# Patient Record
Sex: FEMALE | Race: WHITE | NOT HISPANIC OR LATINO | Employment: OTHER | URBAN - NONMETROPOLITAN AREA
[De-identification: names, ages, dates, MRNs, and addresses within clinical notes are randomized per-mention and may not be internally consistent; named-entity substitution may affect disease eponyms.]

---

## 2017-06-13 ENCOUNTER — HOSPITAL ENCOUNTER (EMERGENCY)
Facility: HOSPITAL | Age: 72
Discharge: HOME | End: 2017-06-13
Attending: EMERGENCY MEDICINE | Admitting: EMERGENCY MEDICINE

## 2017-06-13 ENCOUNTER — APPOINTMENT (OUTPATIENT)
Dept: GENERAL RADIOLOGY | Facility: HOSPITAL | Age: 72
End: 2017-06-13

## 2017-06-13 VITALS
DIASTOLIC BLOOD PRESSURE: 85 MMHG | WEIGHT: 105 LBS | SYSTOLIC BLOOD PRESSURE: 186 MMHG | BODY MASS INDEX: 18.61 KG/M2 | HEART RATE: 63 BPM | TEMPERATURE: 98 F | RESPIRATION RATE: 18 BRPM | HEIGHT: 63 IN | OXYGEN SATURATION: 99 %

## 2017-06-13 DIAGNOSIS — S20.461A: Primary | ICD-10-CM
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
## 2017-06-13 LAB
ALBUMIN SERPL-MCNC: 4.2 G/DL (ref 3.4–4.8)
ALBUMIN/GLOB SERPL: 1.4 G/DL (ref 1.5–2.5)
ALP SERPL-CCNC: 92 U/L (ref 35–104)
ALT SERPL W P-5'-P-CCNC: 13 U/L (ref 10–36)
ANION GAP SERPL CALCULATED.3IONS-SCNC: 11.4 MMOL/L (ref 3.6–11.2)
AST SERPL-CCNC: 18 U/L (ref 10–30)
BASOPHILS # BLD AUTO: 0.01 10*3/MM3 (ref 0–0.3)
BASOPHILS NFR BLD AUTO: 0.2 % (ref 0–2)
BILIRUB SERPL-MCNC: 0.5 MG/DL (ref 0.2–1.8)
BILIRUB UR QL STRIP: NEGATIVE
BUN BLD-MCNC: 12 MG/DL (ref 7–21)
BUN/CREAT SERPL: 14.6 (ref 7–25)
CALCIUM SPEC-SCNC: 9.8 MG/DL (ref 7.7–10)
CHLORIDE SERPL-SCNC: 109 MMOL/L (ref 99–112)
CLARITY UR: CLEAR
CO2 SERPL-SCNC: 22.6 MMOL/L (ref 24.3–31.9)
COLOR UR: YELLOW
CREAT BLD-MCNC: 0.82 MG/DL (ref 0.43–1.29)
DEPRECATED RDW RBC AUTO: 43.3 FL (ref 37–54)
EOSINOPHIL # BLD AUTO: 0.05 10*3/MM3 (ref 0–0.7)
EOSINOPHIL NFR BLD AUTO: 1.2 % (ref 0–7)
ERYTHROCYTE [DISTWIDTH] IN BLOOD BY AUTOMATED COUNT: 13.1 % (ref 11.5–14.5)
GFR SERPL CREATININE-BSD FRML MDRD: 69 ML/MIN/1.73
GLOBULIN UR ELPH-MCNC: 3.1 GM/DL
GLUCOSE BLD-MCNC: 111 MG/DL (ref 70–110)
GLUCOSE UR STRIP-MCNC: NEGATIVE MG/DL
HCT VFR BLD AUTO: 39.1 % (ref 37–47)
HGB BLD-MCNC: 12.9 G/DL (ref 12–16)
HGB UR QL STRIP.AUTO: NEGATIVE
IMM GRANULOCYTES # BLD: 0 10*3/MM3 (ref 0–0.03)
IMM GRANULOCYTES NFR BLD: 0 % (ref 0–0.5)
KETONES UR QL STRIP: NEGATIVE
LEUKOCYTE ESTERASE UR QL STRIP.AUTO: NEGATIVE
LYMPHOCYTES # BLD AUTO: 1.89 10*3/MM3 (ref 1–3)
LYMPHOCYTES NFR BLD AUTO: 46.3 % (ref 16–46)
MCH RBC QN AUTO: 30.6 PG (ref 27–33)
MCHC RBC AUTO-ENTMCNC: 33 G/DL (ref 33–37)
MCV RBC AUTO: 92.9 FL (ref 80–94)
MONOCYTES # BLD AUTO: 0.37 10*3/MM3 (ref 0.1–0.9)
MONOCYTES NFR BLD AUTO: 9.1 % (ref 0–12)
NEUTROPHILS # BLD AUTO: 1.76 10*3/MM3 (ref 1.4–6.5)
NEUTROPHILS NFR BLD AUTO: 43.2 % (ref 40–75)
NITRITE UR QL STRIP: NEGATIVE
OSMOLALITY SERPL CALC.SUM OF ELEC: 285.4 MOSM/KG (ref 273–305)
PH UR STRIP.AUTO: 6 [PH] (ref 5–8)
PLATELET # BLD AUTO: 234 10*3/MM3 (ref 130–400)
PMV BLD AUTO: 9.6 FL (ref 6–10)
POTASSIUM BLD-SCNC: 4.1 MMOL/L (ref 3.5–5.3)
PROT SERPL-MCNC: 7.3 G/DL (ref 6–8)
PROT UR QL STRIP: NEGATIVE
RBC # BLD AUTO: 4.21 10*6/MM3 (ref 4.2–5.4)
SODIUM BLD-SCNC: 143 MMOL/L (ref 135–153)
SP GR UR STRIP: 1.01 (ref 1–1.03)
UROBILINOGEN UR QL STRIP: NORMAL
WBC NRBC COR # BLD: 4.08 10*3/MM3 (ref 4.5–12.5)

## 2017-06-13 PROCEDURE — 81003 URINALYSIS AUTO W/O SCOPE: CPT | Performed by: EMERGENCY MEDICINE

## 2017-06-13 PROCEDURE — 85025 COMPLETE CBC W/AUTO DIFF WBC: CPT | Performed by: EMERGENCY MEDICINE

## 2017-06-13 PROCEDURE — 99283 EMERGENCY DEPT VISIT LOW MDM: CPT

## 2017-06-13 PROCEDURE — 74022 RADEX COMPL AQT ABD SERIES: CPT

## 2017-06-13 PROCEDURE — 74022 RADEX COMPL AQT ABD SERIES: CPT | Performed by: RADIOLOGY

## 2017-06-13 PROCEDURE — 80053 COMPREHEN METABOLIC PANEL: CPT | Performed by: EMERGENCY MEDICINE

## 2017-06-13 RX ORDER — ESCITALOPRAM OXALATE 20 MG/1
10 TABLET ORAL DAILY
COMMUNITY

## 2017-06-13 RX ORDER — AMLODIPINE BESYLATE 5 MG/1
5 TABLET ORAL DAILY
COMMUNITY

## 2017-06-13 NOTE — ED NOTES
Pt resting on side of bed, nad noted, states that she thinks that she has just pulled a muscle; nad noted at this time     Nayana Mariscal RN  06/13/17 1500

## 2017-06-13 NOTE — ED NOTES
No changes in pt status, nad noted, will continue to monitor.     Nayana Mariscal RN  06/13/17 [id number]

## 2017-06-13 NOTE — ED PROVIDER NOTES
Subjective   History of Present Illness  71-year-old white female complains of insect bite.  She says about one week ago she noticed a red spot on her right lumbar/flank area.  Her friend doctor this for her and it seems to be improving.  However now she complains of some abdominal pain in the right lower quadrant especially worse at night.  She says she's had a history of colitis due to stress, and has been under increased stress recently.  She's had mild dysuria, but denies any hematuria, fever, chills, nausea, vomiting or other complaints.  Review of Systems   All other systems reviewed and are negative.      Past Medical History:   Diagnosis Date   • Depression    • Hypertension        Allergies   Allergen Reactions   • Bactrim [Sulfamethoxazole-Trimethoprim]        History reviewed. No pertinent surgical history.    Family History   Problem Relation Age of Onset   • Heart failure Father        Social History     Social History   • Marital status:      Spouse name: N/A   • Number of children: N/A   • Years of education: N/A     Social History Main Topics   • Smoking status: Never Smoker   • Smokeless tobacco: None   • Alcohol use No   • Drug use: No   • Sexual activity: Defer     Other Topics Concern   • None     Social History Narrative   • None           Objective   Physical Exam   Constitutional: She is oriented to person, place, and time. She appears well-developed and well-nourished.   HENT:   Head: Normocephalic and atraumatic.   Cardiovascular: Normal rate, regular rhythm and normal heart sounds.  Exam reveals no gallop and no friction rub.    No murmur heard.  Pulmonary/Chest: Effort normal and breath sounds normal. No respiratory distress. She has no wheezes. She has no rales.   Abdominal: Soft. Bowel sounds are normal. She exhibits no distension. There is no tenderness.   Musculoskeletal: Normal range of motion. She exhibits no edema.   Neurological: She is alert and oriented to person, place,  and time.   Skin: Skin is warm and dry.        Healing superficial ulcer with eschar noted in the right lumbar area with no surrounding erythema, tenderness, discharge, induration, fluctuance.  It appears to be composed of 4 confluent round lesions.   Psychiatric: She has a normal mood and affect.   Nursing note and vitals reviewed.      Procedures  Results for orders placed or performed during the hospital encounter of 06/13/17   Comprehensive Metabolic Panel   Result Value Ref Range    Glucose 111 (H) 70 - 110 mg/dL    BUN 12 7 - 21 mg/dL    Creatinine 0.82 0.43 - 1.29 mg/dL    Sodium 143 135 - 153 mmol/L    Potassium 4.1 3.5 - 5.3 mmol/L    Chloride 109 99 - 112 mmol/L    CO2 22.6 (L) 24.3 - 31.9 mmol/L    Calcium 9.8 7.7 - 10.0 mg/dL    Total Protein 7.3 6.0 - 8.0 g/dL    Albumin 4.20 3.40 - 4.80 g/dL    ALT (SGPT) 13 10 - 36 U/L    AST (SGOT) 18 10 - 30 U/L    Alkaline Phosphatase 92 35 - 104 U/L    Total Bilirubin 0.5 0.2 - 1.8 mg/dL    eGFR Non African Amer 69 >60 mL/min/1.73    Globulin 3.1 gm/dL    A/G Ratio 1.4 (L) 1.5 - 2.5 g/dL    BUN/Creatinine Ratio 14.6 7.0 - 25.0    Anion Gap 11.4 (H) 3.6 - 11.2 mmol/L   Urinalysis With / Culture If Indicated   Result Value Ref Range    Color, UA Yellow Yellow, Straw    Appearance, UA Clear Clear    pH, UA 6.0 5.0 - 8.0    Specific Gravity, UA 1.009 1.005 - 1.030    Glucose, UA Negative Negative    Ketones, UA Negative Negative    Bilirubin, UA Negative Negative    Blood, UA Negative Negative    Protein, UA Negative Negative    Leuk Esterase, UA Negative Negative    Nitrite, UA Negative Negative    Urobilinogen, UA 0.2 E.U./dL 0.2 - 1.0 E.U./dL   CBC Auto Differential   Result Value Ref Range    WBC 4.08 (L) 4.50 - 12.50 10*3/mm3    RBC 4.21 4.20 - 5.40 10*6/mm3    Hemoglobin 12.9 12.0 - 16.0 g/dL    Hematocrit 39.1 37.0 - 47.0 %    MCV 92.9 80.0 - 94.0 fL    MCH 30.6 27.0 - 33.0 pg    MCHC 33.0 33.0 - 37.0 g/dL    RDW 13.1 11.5 - 14.5 %    RDW-SD 43.3 37.0 - 54.0 fl     MPV 9.6 6.0 - 10.0 fL    Platelets 234 130 - 400 10*3/mm3    Neutrophil % 43.2 40.0 - 75.0 %    Lymphocyte % 46.3 (H) 16.0 - 46.0 %    Monocyte % 9.1 0.0 - 12.0 %    Eosinophil % 1.2 0.0 - 7.0 %    Basophil % 0.2 0.0 - 2.0 %    Immature Grans % 0.0 0.0 - 0.5 %    Neutrophils, Absolute 1.76 1.40 - 6.50 10*3/mm3    Lymphocytes, Absolute 1.89 1.00 - 3.00 10*3/mm3    Monocytes, Absolute 0.37 0.10 - 0.90 10*3/mm3    Eosinophils, Absolute 0.05 0.00 - 0.70 10*3/mm3    Basophils, Absolute 0.01 0.00 - 0.30 10*3/mm3    Immature Grans, Absolute 0.00 0.00 - 0.03 10*3/mm3   Osmolality, Calculated   Result Value Ref Range    Osmolality Calc 285.4 273.0 - 305.0 mOsm/kg     Xr Abdomen 2 View With Chest 1 View    Result Date: 6/13/2017  Narrative: XR ABDOMEN 3 VW-  REASON FOR EXAM:  RLQ  pain  The included view of the chest shows the lungs to be well aerated and clear. No pleural effusion or free air was seen beneath the diaphragm. Supine and upright views of the abdomen show a nonspecific bowel gas pattern. There are no significant air-fluid levels or findings of obstruction. No soft tissue masses or pathological calcifications are demonstrated.      Impression: Nonspecific acute abdominal series.  This report was finalized on 6/13/2017 2:02 PM by Dr. Papi Palma II, MD.             ED Course  ED Course   Comment By Time   Have reviewed the results of workup with patient and family.  Asymptomatic at this time.  No evidence of infection of this bite, and it is doubtful that this is causing her symptoms.  She could have some hip arthritis causing right groin pain, she also states that she's been lifting her grandchild and thinks he could have pulled a muscle.  Have recommended continued conservative care with Tylenol and Motrin and follow up with PMD.  No evidence of appendicitis or peritonitis. Stefano Nj MD 06/13 3361                  MDM  Number of Diagnoses or Management Options  Nonvenomous insect bite of right side of  back without infection, initial encounter:      Amount and/or Complexity of Data Reviewed  Clinical lab tests: reviewed and ordered  Tests in the radiology section of CPT®: reviewed and ordered  Independent visualization of images, tracings, or specimens: yes    Risk of Complications, Morbidity, and/or Mortality  Presenting problems: high  Diagnostic procedures: high  Management options: moderate        Final diagnoses:   Nonvenomous insect bite of right side of back without infection, initial encounter            Stefano Nj MD  06/13/17 9935

## 2017-06-13 NOTE — ED NOTES
PT STATES I HAVE PAIN IN RIGHT SIDE A Small  AREA  Noted with  CRUSTY COVERING IN HEALING STAGES      Lisa Hernandez RN  06/13/17 1277

## 2018-06-22 ENCOUNTER — APPOINTMENT (OUTPATIENT)
Dept: MAMMOGRAPHY | Facility: HOSPITAL | Age: 73
End: 2018-06-22

## 2018-06-22 ENCOUNTER — HOSPITAL ENCOUNTER (OUTPATIENT)
Dept: BONE DENSITY | Facility: HOSPITAL | Age: 73
End: 2018-06-22

## 2020-11-23 ENCOUNTER — TRANSCRIBE ORDERS (OUTPATIENT)
Dept: ADMINISTRATIVE | Facility: HOSPITAL | Age: 75
End: 2020-11-23

## 2020-11-23 DIAGNOSIS — Z01.818 PRE-OPERATIVE CLEARANCE: Primary | ICD-10-CM

## 2021-01-13 ENCOUNTER — HOSPITAL ENCOUNTER (OUTPATIENT)
Dept: MAMMOGRAPHY | Facility: HOSPITAL | Age: 76
Discharge: HOME | End: 2021-01-13
Admitting: NURSE PRACTITIONER

## 2021-01-13 DIAGNOSIS — Z12.31 VISIT FOR SCREENING MAMMOGRAM: ICD-10-CM

## 2021-01-13 PROCEDURE — 77063 BREAST TOMOSYNTHESIS BI: CPT

## 2021-01-13 PROCEDURE — 77067 SCR MAMMO BI INCL CAD: CPT

## 2021-01-21 ENCOUNTER — HOSPITAL ENCOUNTER (OUTPATIENT)
Dept: MAMMOGRAPHY | Facility: HOSPITAL | Age: 76
Discharge: HOME | End: 2021-01-21
Admitting: NURSE PRACTITIONER

## 2021-01-21 PROCEDURE — 77067 SCR MAMMO BI INCL CAD: CPT

## 2021-01-21 PROCEDURE — 77063 BREAST TOMOSYNTHESIS BI: CPT

## 2021-01-21 PROCEDURE — 77063 BREAST TOMOSYNTHESIS BI: CPT | Performed by: RADIOLOGY

## 2021-01-21 PROCEDURE — 77067 SCR MAMMO BI INCL CAD: CPT | Performed by: RADIOLOGY

## 2021-02-08 ENCOUNTER — OFFICE VISIT (OUTPATIENT)
Dept: CARDIOLOGY | Facility: CLINIC | Age: 76
End: 2021-02-08

## 2021-02-08 VITALS
TEMPERATURE: 97.5 F | SYSTOLIC BLOOD PRESSURE: 148 MMHG | HEIGHT: 64 IN | WEIGHT: 110 LBS | DIASTOLIC BLOOD PRESSURE: 64 MMHG | HEART RATE: 63 BPM | OXYGEN SATURATION: 98 % | BODY MASS INDEX: 18.78 KG/M2

## 2021-02-08 DIAGNOSIS — I25.10 CORONARY ARTERY CALCIFICATION SEEN ON CAT SCAN: ICD-10-CM

## 2021-02-08 DIAGNOSIS — Z82.49 FAMILY HISTORY OF CORONARY ARTERY DISEASE: ICD-10-CM

## 2021-02-08 DIAGNOSIS — R07.9 CHEST PAIN IN ADULT: Primary | ICD-10-CM

## 2021-02-08 DIAGNOSIS — I10 ESSENTIAL HYPERTENSION: ICD-10-CM

## 2021-02-08 PROCEDURE — 99204 OFFICE O/P NEW MOD 45 MIN: CPT | Performed by: INTERNAL MEDICINE

## 2021-02-08 PROCEDURE — 93000 ELECTROCARDIOGRAM COMPLETE: CPT | Performed by: INTERNAL MEDICINE

## 2021-02-08 RX ORDER — CHOLECALCIFEROL (VITAMIN D3) 125 MCG
CAPSULE ORAL
COMMUNITY
Start: 2020-10-22

## 2021-02-08 RX ORDER — HYDROXYZINE PAMOATE 25 MG/1
1 CAPSULE ORAL DAILY PRN
COMMUNITY
Start: 2020-10-22

## 2021-02-08 RX ORDER — OMEPRAZOLE 20 MG/1
1 CAPSULE, DELAYED RELEASE ORAL
COMMUNITY
Start: 2020-10-22

## 2021-02-08 RX ORDER — LOSARTAN POTASSIUM 25 MG/1
25 TABLET ORAL DAILY
Qty: 90 TABLET | Refills: 0 | Status: SHIPPED | OUTPATIENT
Start: 2021-02-08 | End: 2021-03-08

## 2021-02-08 NOTE — PROGRESS NOTES
Subjective   Chief Complaint   Patient presents with   • Chest Pain     Eval Heart       History of Present Illness  Patient is 75 years old white female who is here for cardiac evaluation because of abnormal coronary calcification on CT scan of the chest and also having chest tightness and pressure sensation.    She states that she has strong family history of premature coronary artery disease and lately has been having some chest tightness and pressure sensation which is 4-5 out of 10.  It is dull achy feeling with no radiation of the pain.  No accompanying nausea vomiting or diaphoresis.  This has been going on for 4 to 5 months.  It is not getting any worse.    Risk factors include hypertension and family history of premature coronary artery disease  She has no history of hyperlipidemia or diabetes mellitus and no history of tobacco use..  Patient also does not have any prior coronary artery disease or valvular heart disease.    Past Surgical History:   Procedure Laterality Date   • BREAST BIOPSY Right    • VAGINAL DELIVERY       Family History   Problem Relation Age of Onset   • Heart failure Father    • Heart attack Father    • Breast cancer Sister 45   • Ovarian cancer Mother      Past Medical History:   Diagnosis Date   • Depression    • Hypertension    • Osteoporosis        Patient Active Problem List   Diagnosis   • Essential hypertension   • Coronary artery calcification seen on CAT scan   • Family history of coronary artery disease   • Chest pain in adult         Social History     Tobacco Use   • Smoking status: Never Smoker   • Smokeless tobacco: Never Used   Substance Use Topics   • Alcohol use: No   • Drug use: No         The following portions of the patient's history were reviewed and updated as appropriate: allergies, current medications, past family history, past medical history, past social history, past surgical history and problem list.    Allergies   Allergen Reactions   • Codeine GI  "Intolerance   • Bactrim [Sulfamethoxazole-Trimethoprim]          Current Outpatient Medications:   •  amLODIPine (NORVASC) 5 MG tablet, Take 5 mg by mouth Daily., Disp: , Rfl:   •  Cholecalciferol (Vitamin D3) 50 MCG (2000 UT) tablet, 1 tab QD x 8 weeks, Disp: , Rfl:   •  escitalopram (LEXAPRO) 20 MG tablet, Take 10 mg by mouth Daily., Disp: , Rfl:   •  hydrOXYzine pamoate (Vistaril) 25 MG capsule, Take 1 capsule by mouth Daily As Needed., Disp: , Rfl:   •  omeprazole (PrilOSEC) 20 MG capsule, Take 1 capsule by mouth., Disp: , Rfl:   •  losartan (Cozaar) 25 MG tablet, Take 1 tablet by mouth Daily., Disp: 90 tablet, Rfl: 0    Review of Systems   Constitution: Negative.   HENT: Negative.  Negative for congestion.    Eyes: Negative.    Cardiovascular: Positive for chest pain. Negative for cyanosis, dyspnea on exertion, irregular heartbeat, leg swelling, near-syncope, orthopnea, palpitations, paroxysmal nocturnal dyspnea and syncope.   Respiratory: Negative.  Negative for shortness of breath.    Hematologic/Lymphatic: Negative.    Musculoskeletal: Negative.    Gastrointestinal: Negative.    Neurological: Negative.  Negative for headaches.        Objective      /64   Pulse 63   Temp 97.5 °F (36.4 °C)   Ht 161.3 cm (63.5\")   Wt 49.9 kg (110 lb)   SpO2 98%   BMI 19.18 kg/m²     Cardiovascular:      PMI at left midclavicular line. Normal rate. Regular rhythm. Normal S1. Normal S2.      Murmurs: There is no murmur.      No gallop. No click. No rub.   Pulses:     Intact distal pulses.   Edema:     Peripheral edema absent.         Lab Review:    No lab work available.  Lab work requested from PCP      ECG 12 Lead    Date/Time: 2/8/2021 5:25 PM  Performed by: Gavino Crouch MD  Authorized by: Gavino Crouch MD   Comparison: not compared with previous ECG   Previous ECG: no previous ECG available  Rhythm: sinus rhythm  Rate: normal  BPM: 63  Conduction: conduction normal  Conduction comments: Right " ventricular conduction delay  T inversion: II, III and aVF  QRS axis: normal  Other findings: non-specific ST-T wave changes    Clinical impression: abnormal EKG            I reviewed the patient's new clinical results.  I personally viewed and interpreted the patient's EKG/lab data        Assessment:   Diagnosis Plan   1. Chest pain in adult  Stress Test With Myocardial Perfusion One Day    Adult Transthoracic Echo Complete W/ Cont if Necessary Per Protocol    ECG 12 Lead   2. Family history of coronary artery disease     3. Essential hypertension     4. Coronary artery calcification seen on CAT scan  Stress Test With Myocardial Perfusion One Day          Plan:    Patient is 75 years old white female who has history of hypertension and family history of premature coronary artery disease.  She also states that her lipids have been abnormal however no records are available copy of lipids requested.    Blood pressure is elevated she was advised to continue Norvasc 5 mg daily, losartan 25 mg was added.    She has abnormal coronary calcification on CT scan of the chest  Because of multiple risk factors for coronary artery disease along with coronary artery calcification further cardiac work-up is indicated.    Echo and stress test was scheduled for further evaluation.  Healthy lifestyle emphasized.  Will review lab work from her PCP.  She will be reevaluated after cardiac work-up is completed.      Thank you for giving me the oppertunity to participate in your patient's cardiac care.    Sincerely,    MARKUS Crouch M.D. FACP FAC     No follow-ups on file.

## 2021-03-01 ENCOUNTER — HOSPITAL ENCOUNTER (OUTPATIENT)
Dept: NUCLEAR MEDICINE | Facility: HOSPITAL | Age: 76
Discharge: HOME | End: 2021-03-01

## 2021-03-01 ENCOUNTER — HOSPITAL ENCOUNTER (OUTPATIENT)
Dept: CARDIOLOGY | Facility: HOSPITAL | Age: 76
Discharge: HOME | End: 2021-03-01
Admitting: INTERNAL MEDICINE

## 2021-03-01 ENCOUNTER — HOSPITAL ENCOUNTER (OUTPATIENT)
Dept: CARDIOLOGY | Facility: HOSPITAL | Age: 76
Discharge: HOME | End: 2021-03-01

## 2021-03-01 DIAGNOSIS — R07.9 CHEST PAIN IN ADULT: ICD-10-CM

## 2021-03-01 DIAGNOSIS — I25.10 CORONARY ARTERY CALCIFICATION SEEN ON CAT SCAN: ICD-10-CM

## 2021-03-01 LAB
BH CV ECHO MEAS - % IVS THICK: 33.3 %
BH CV ECHO MEAS - % LVPW THICK: 72.4 %
BH CV ECHO MEAS - ACS: 1.7 CM
BH CV ECHO MEAS - AO MAX PG: 13.5 MMHG
BH CV ECHO MEAS - AO MEAN PG: 6 MMHG
BH CV ECHO MEAS - AO ROOT AREA (BSA CORRECTED): 1.5
BH CV ECHO MEAS - AO ROOT AREA: 4.2 CM^2
BH CV ECHO MEAS - AO ROOT DIAM: 2.3 CM
BH CV ECHO MEAS - AO V2 MAX: 184 CM/SEC
BH CV ECHO MEAS - AO V2 MEAN: 117 CM/SEC
BH CV ECHO MEAS - AO V2 VTI: 43.8 CM
BH CV ECHO MEAS - BSA(HAYCOCK): 1.5 M^2
BH CV ECHO MEAS - BSA: 1.5 M^2
BH CV ECHO MEAS - BZI_BMI: 19.5 KILOGRAMS/M^2
BH CV ECHO MEAS - BZI_METRIC_HEIGHT: 160 CM
BH CV ECHO MEAS - BZI_METRIC_WEIGHT: 49.9 KG
BH CV ECHO MEAS - EDV(CUBED): 59.8 ML
BH CV ECHO MEAS - EDV(MOD-SP4): 27.2 ML
BH CV ECHO MEAS - EDV(TEICH): 66.3 ML
BH CV ECHO MEAS - EF(CUBED): 77.4 %
BH CV ECHO MEAS - EF(MOD-SP4): 60.3 %
BH CV ECHO MEAS - EF(TEICH): 70.2 %
BH CV ECHO MEAS - ESV(CUBED): 13.5 ML
BH CV ECHO MEAS - ESV(MOD-SP4): 10.8 ML
BH CV ECHO MEAS - ESV(TEICH): 19.7 ML
BH CV ECHO MEAS - FS: 39.1 %
BH CV ECHO MEAS - IVS/LVPW: 0.97
BH CV ECHO MEAS - IVSD: 1 CM
BH CV ECHO MEAS - IVSS: 1.4 CM
BH CV ECHO MEAS - LA DIMENSION: 3.4 CM
BH CV ECHO MEAS - LA/AO: 1.5
BH CV ECHO MEAS - LV DIASTOLIC VOL/BSA (35-75): 18.1 ML/M^2
BH CV ECHO MEAS - LV MASS(C)D: 131.7 GRAMS
BH CV ECHO MEAS - LV MASS(C)DI: 87.8 GRAMS/M^2
BH CV ECHO MEAS - LV MASS(C)S: 135.5 GRAMS
BH CV ECHO MEAS - LV MASS(C)SI: 90.3 GRAMS/M^2
BH CV ECHO MEAS - LV SYSTOLIC VOL/BSA (12-30): 7.2 ML/M^2
BH CV ECHO MEAS - LVIDD: 3.9 CM
BH CV ECHO MEAS - LVIDS: 2.4 CM
BH CV ECHO MEAS - LVLD AP4: 6.2 CM
BH CV ECHO MEAS - LVLS AP4: 5.4 CM
BH CV ECHO MEAS - LVOT AREA (M): 2.3 CM^2
BH CV ECHO MEAS - LVOT AREA: 2.3 CM^2
BH CV ECHO MEAS - LVOT DIAM: 1.7 CM
BH CV ECHO MEAS - LVPWD: 1.1 CM
BH CV ECHO MEAS - LVPWS: 1.8 CM
BH CV ECHO MEAS - MV A MAX VEL: 113 CM/SEC
BH CV ECHO MEAS - MV E MAX VEL: 117 CM/SEC
BH CV ECHO MEAS - MV E/A: 1
BH CV ECHO MEAS - PA ACC TIME: 0.17 SEC
BH CV ECHO MEAS - PA PR(ACCEL): 3 MMHG
BH CV ECHO MEAS - RAP SYSTOLE: 10 MMHG
BH CV ECHO MEAS - RVSP: 31.3 MMHG
BH CV ECHO MEAS - SI(AO): 121.3 ML/M^2
BH CV ECHO MEAS - SI(CUBED): 30.9 ML/M^2
BH CV ECHO MEAS - SI(MOD-SP4): 10.9 ML/M^2
BH CV ECHO MEAS - SI(TEICH): 31 ML/M^2
BH CV ECHO MEAS - SV(AO): 182 ML
BH CV ECHO MEAS - SV(CUBED): 46.3 ML
BH CV ECHO MEAS - SV(MOD-SP4): 16.4 ML
BH CV ECHO MEAS - SV(TEICH): 46.6 ML
BH CV ECHO MEAS - TR MAX VEL: 231 CM/SEC
BH CV NUCLEAR PRIOR STUDY: 3
BH CV STRESS BP STAGE 1: NORMAL
BH CV STRESS BP STAGE 2: NORMAL
BH CV STRESS COMMENTS STAGE 1: NORMAL
BH CV STRESS COMMENTS STAGE 2: NORMAL
BH CV STRESS DOSE REGADENOSON STAGE 1: 0.4
BH CV STRESS DURATION MIN STAGE 1: 0
BH CV STRESS DURATION MIN STAGE 2: 4
BH CV STRESS DURATION SEC STAGE 1: 10
BH CV STRESS DURATION SEC STAGE 2: 0
BH CV STRESS HR STAGE 1: 119
BH CV STRESS HR STAGE 2: 108
BH CV STRESS PROTOCOL 1: NORMAL
BH CV STRESS RECOVERY BP: NORMAL MMHG
BH CV STRESS RECOVERY HR: 96 BPM
BH CV STRESS STAGE 1: 1
BH CV STRESS STAGE 2: 2
LV EF NUC BP: 92 %
MAXIMAL PREDICTED HEART RATE: 146 BPM
MAXIMAL PREDICTED HEART RATE: 146 BPM
PERCENT MAX PREDICTED HR: 73.97 %
STRESS BASELINE BP: NORMAL MMHG
STRESS BASELINE HR: 68 BPM
STRESS PERCENT HR: 87 %
STRESS POST PEAK BP: NORMAL MMHG
STRESS POST PEAK HR: 108 BPM
STRESS TARGET HR: 124 BPM
STRESS TARGET HR: 124 BPM

## 2021-03-01 PROCEDURE — 25010000002 REGADENOSON 0.4 MG/5ML SOLUTION: Performed by: INTERNAL MEDICINE

## 2021-03-01 PROCEDURE — 93306 TTE W/DOPPLER COMPLETE: CPT

## 2021-03-01 PROCEDURE — 93017 CV STRESS TEST TRACING ONLY: CPT

## 2021-03-01 PROCEDURE — 93018 CV STRESS TEST I&R ONLY: CPT | Performed by: INTERNAL MEDICINE

## 2021-03-01 PROCEDURE — A9500 TC99M SESTAMIBI: HCPCS | Performed by: INTERNAL MEDICINE

## 2021-03-01 PROCEDURE — 0 TECHNETIUM SESTAMIBI: Performed by: INTERNAL MEDICINE

## 2021-03-01 PROCEDURE — 78452 HT MUSCLE IMAGE SPECT MULT: CPT

## 2021-03-01 PROCEDURE — 78452 HT MUSCLE IMAGE SPECT MULT: CPT | Performed by: INTERNAL MEDICINE

## 2021-03-01 PROCEDURE — 93306 TTE W/DOPPLER COMPLETE: CPT | Performed by: INTERNAL MEDICINE

## 2021-03-01 RX ADMIN — TECHNETIUM TC 99M SESTAMIBI 1 DOSE: 1 INJECTION INTRAVENOUS at 12:41

## 2021-03-01 RX ADMIN — REGADENOSON 0.4 MG: 0.08 INJECTION, SOLUTION INTRAVENOUS at 12:41

## 2021-03-01 RX ADMIN — TECHNETIUM TC 99M SESTAMIBI 1 DOSE: 1 INJECTION INTRAVENOUS at 10:35

## 2021-03-08 ENCOUNTER — OFFICE VISIT (OUTPATIENT)
Dept: CARDIOLOGY | Facility: CLINIC | Age: 76
End: 2021-03-08

## 2021-03-08 VITALS
HEIGHT: 64 IN | OXYGEN SATURATION: 98 % | WEIGHT: 108.8 LBS | BODY MASS INDEX: 18.57 KG/M2 | RESPIRATION RATE: 14 BRPM | SYSTOLIC BLOOD PRESSURE: 122 MMHG | TEMPERATURE: 97.6 F | HEART RATE: 75 BPM | DIASTOLIC BLOOD PRESSURE: 54 MMHG

## 2021-03-08 DIAGNOSIS — I10 ESSENTIAL HYPERTENSION: ICD-10-CM

## 2021-03-08 DIAGNOSIS — R93.1 ABNORMAL ECHOCARDIOGRAM: ICD-10-CM

## 2021-03-08 DIAGNOSIS — I25.10 CORONARY ARTERY CALCIFICATION SEEN ON CAT SCAN: Primary | ICD-10-CM

## 2021-03-08 DIAGNOSIS — Z82.49 FAMILY HISTORY OF CORONARY ARTERY DISEASE: ICD-10-CM

## 2021-03-08 PROCEDURE — 99214 OFFICE O/P EST MOD 30 MIN: CPT | Performed by: INTERNAL MEDICINE

## 2021-03-08 NOTE — PROGRESS NOTES
subjective     Chief Complaint   Patient presents with   • Results     test results     History of Present Illness  Alize is 75 years old white female who is here for cardiac follow-up.  She was initially seen by me because of abnormal CT scan showing calcification of the coronary arteries.  She has multiple risk factors and underwent cardiac work-up.  She had echo and stress test done.  She is here for follow-up.    Last visit patient's blood pressure was mildly elevated and she was started on losartan however patient states that she never started the medicine because blood pressure has been completely normal.  She is taking Norvasc 5 mg daily.  Because of family history of coronary artery disease and abnormal calcification of coronary arteries she had a stress test done which will be reviewed.  She is asymptomatic.      Past Surgical History:   Procedure Laterality Date   • BREAST BIOPSY Right    • VAGINAL DELIVERY       Family History   Problem Relation Age of Onset   • Heart failure Father    • Heart attack Father    • Breast cancer Sister 45   • Ovarian cancer Mother      Past Medical History:   Diagnosis Date   • Depression    • Hypertension    • Osteoporosis      Patient Active Problem List   Diagnosis   • Essential hypertension   • Coronary artery calcification seen on CAT scan   • Family history of coronary artery disease   • Chest pain in adult   • Abnormal echocardiogram, trace pericardial effusion suggested       Social History     Tobacco Use   • Smoking status: Never Smoker   • Smokeless tobacco: Never Used   Vaping Use   • Vaping Use: Never used   Substance Use Topics   • Alcohol use: No   • Drug use: No       Allergies   Allergen Reactions   • Codeine GI Intolerance   • Bactrim [Sulfamethoxazole-Trimethoprim]        Current Outpatient Medications on File Prior to Visit   Medication Sig   • amLODIPine (NORVASC) 5 MG tablet Take 5 mg by mouth Daily.   • Cholecalciferol (Vitamin D3) 50 MCG (2000 UT)  "tablet 1 tab QD x 8 weeks   • escitalopram (LEXAPRO) 20 MG tablet Take 10 mg by mouth Daily.   • hydrOXYzine pamoate (Vistaril) 25 MG capsule Take 1 capsule by mouth Daily As Needed.   • omeprazole (PrilOSEC) 20 MG capsule Take 1 capsule by mouth.   • [DISCONTINUED] losartan (Cozaar) 25 MG tablet Take 1 tablet by mouth Daily.     No current facility-administered medications on file prior to visit.         The following portions of the patient's history were reviewed and updated as appropriate: allergies, current medications, past family history, past medical history, past social history, past surgical history and problem list.    Review of Systems   Constitutional: Negative.   HENT: Negative.  Negative for congestion.    Eyes: Negative.    Cardiovascular: Negative.  Negative for chest pain, cyanosis, dyspnea on exertion, irregular heartbeat, leg swelling, near-syncope, orthopnea, palpitations, paroxysmal nocturnal dyspnea and syncope.   Respiratory: Negative.  Negative for shortness of breath.    Hematologic/Lymphatic: Negative.    Musculoskeletal: Negative.    Gastrointestinal: Negative.    Neurological: Negative.  Negative for headaches.          Objective:     /54 (BP Location: Left arm, Patient Position: Sitting, Cuff Size: Adult)   Pulse 75   Temp 97.6 °F (36.4 °C) (Temporal)   Resp 14   Ht 161.3 cm (63.5\")   Wt 49.4 kg (108 lb 12.8 oz)   SpO2 98%   BMI 18.97 kg/m²   Vitals and nursing note reviewed.   Cardiovascular:      PMI at left midclavicular line. Normal rate. Regular rhythm. Normal S1. Normal S2.      Murmurs: There is no murmur.      No gallop. No click. No rub.   Pulses:     Intact distal pulses.   Edema:     Peripheral edema absent.           Lab Review    No lab work is available.    Procedures  Interpretation Summary Lexiscan stress test March 1, 2021    · A pharmacological stress test was performed using regadenoson without low-level exercise.  · Resting EKG showed sinus rhythm rate " of 72/min, incomplete right bundle branch block and nonspecific ST and T wave changes were noted in leads II, III, aVF and V3 through V6  · ST segments did not show any diagnostic changes. There was half millimeter ST depression in chest leads V4, V5 and V6 during recovery  · Left ventricular ejection fraction is hyperdynamic (Calculated EF > 70%). .  · Myocardial perfusion imaging indicates a normal myocardial perfusion study with no evidence of ischemia.  · Impressions are consistent with a low risk study.  · There is no prior study available for comparison.  · Stress test is felt to be negative for significant exercise-induced myocardial ischemia.     Interpretation Summary echocardiogram March 1, 2021    · Normal left ventricular cavity size noted.  · Left ventricular wall thickness is consistent with borderline concentric hypertrophy.  · Left ventricular ejection fraction appears to be 61 - 65%. Left ventricular systolic function is normal.  · Left ventricular diastolic function is consistent with (grade I) impaired relaxation.  · No significant valvular heart disease  · There is a trivial pericardial effusion. There is no evidence of cardiac tamponade.             I personally viewed and interpreted the patient's LAB data         Assessment:     1. Coronary artery calcification seen on CAT scan    2. Family history of coronary artery disease    3. Essential hypertension    4. Abnormal echocardiogram, trace pericardial effusion suggested          Plan:     Patient is 75 years old white female who was evaluated because of abnormal CT scan showing calcification of coronary arteries.  Patient is asymptomatic.  She underwent cardiac work-up with stress test was negative for significant exercise-induced myocardial ischemia with normal LV ejection fraction.    Echocardiogram was also done which showed grade 1 LV diastolic dysfunction with normal LV systolic functions.  Borderline concentric left ventricular  hypertrophy was noted.  There is small echo-free space jesting per small pericardial effusion versus fat pad.  It was compared with CT scan of the chest there was no pericardial effusion on CT scan.    Patient was reassured however in 6 months she will need a follow-up echocardiogram to check for possible pericardial effusion.  Aggressive risk factor modification was emphasized at this time she does not appear to have significant coronary artery disease.  Her blood pressure is also normal and she did not start losartan she will continue Norvasc.  Follow-up scheduled        No follow-ups on file.

## 2021-03-16 ENCOUNTER — TRANSCRIBE ORDERS (OUTPATIENT)
Dept: ADMINISTRATIVE | Facility: HOSPITAL | Age: 76
End: 2021-03-16

## 2021-03-16 DIAGNOSIS — R13.10 PROBLEMS WITH SWALLOWING AND MASTICATION: Primary | ICD-10-CM

## 2021-03-29 ENCOUNTER — HOSPITAL ENCOUNTER (OUTPATIENT)
Dept: ULTRASOUND IMAGING | Facility: HOSPITAL | Age: 76
Discharge: HOME | End: 2021-03-29
Admitting: NURSE PRACTITIONER

## 2021-03-29 DIAGNOSIS — R13.10 PROBLEMS WITH SWALLOWING AND MASTICATION: ICD-10-CM

## 2021-03-29 PROCEDURE — 76536 US EXAM OF HEAD AND NECK: CPT | Performed by: RADIOLOGY

## 2021-03-29 PROCEDURE — 76536 US EXAM OF HEAD AND NECK: CPT

## 2021-04-09 ENCOUNTER — APPOINTMENT (OUTPATIENT)
Dept: ULTRASOUND IMAGING | Facility: HOSPITAL | Age: 76
End: 2021-04-09

## 2021-08-31 ENCOUNTER — TRANSCRIBE ORDERS (OUTPATIENT)
Dept: ADMINISTRATIVE | Facility: HOSPITAL | Age: 76
End: 2021-08-31

## 2021-08-31 DIAGNOSIS — Z11.52 ENCOUNTER FOR SCREENING FOR COVID-19: Primary | ICD-10-CM

## 2022-06-06 ENCOUNTER — OFFICE VISIT (OUTPATIENT)
Dept: UROLOGY | Facility: CLINIC | Age: 77
End: 2022-06-06

## 2022-06-06 VITALS — HEIGHT: 63 IN | BODY MASS INDEX: 19.14 KG/M2 | WEIGHT: 108 LBS

## 2022-06-06 DIAGNOSIS — R31.0 GROSS HEMATURIA: Primary | ICD-10-CM

## 2022-06-06 DIAGNOSIS — N81.6 PELVIC ORGAN PROLAPSE QUANTIFICATION STAGE 3 RECTOCELE: ICD-10-CM

## 2022-06-06 DIAGNOSIS — N39.0 URINARY TRACT INFECTION WITHOUT HEMATURIA, SITE UNSPECIFIED: ICD-10-CM

## 2022-06-06 DIAGNOSIS — R35.0 FREQUENCY OF URINATION: ICD-10-CM

## 2022-06-06 LAB
BILIRUB BLD-MCNC: NEGATIVE MG/DL
CLARITY, POC: CLEAR
COLOR UR: YELLOW
EXPIRATION DATE: NORMAL
GLUCOSE UR STRIP-MCNC: NEGATIVE MG/DL
KETONES UR QL: NEGATIVE
LEUKOCYTE EST, POC: NEGATIVE
Lab: NORMAL
NITRITE UR-MCNC: NEGATIVE MG/ML
PH UR: 5.5 [PH] (ref 5–8)
PROT UR STRIP-MCNC: NEGATIVE MG/DL
RBC # UR STRIP: NEGATIVE /UL
SP GR UR: 1.03 (ref 1–1.03)
UROBILINOGEN UR QL: NORMAL

## 2022-06-06 PROCEDURE — 99204 OFFICE O/P NEW MOD 45 MIN: CPT | Performed by: NURSE PRACTITIONER

## 2022-06-06 PROCEDURE — 51798 US URINE CAPACITY MEASURE: CPT | Performed by: NURSE PRACTITIONER

## 2022-06-06 PROCEDURE — 81003 URINALYSIS AUTO W/O SCOPE: CPT | Performed by: NURSE PRACTITIONER

## 2022-06-06 PROCEDURE — 87086 URINE CULTURE/COLONY COUNT: CPT | Performed by: NURSE PRACTITIONER

## 2022-06-06 NOTE — PROGRESS NOTES
"Chief Complaint  MICROSCOIC HEMATURIA/ACUTE CYSTITIS/OAB (NEW PT WITH MICRO/GROSS HEMATURIA/OAB/UTIS/POP)    Gurinder Wills presents to [facility]  History of Present Illness      MS ROGER Wills  is a pleasant 76-year-old female patient, who presents to clinic today for evaluation.  She has been referred to us by her OBGYN-DRMary Beth ALCALA, with concerns of recurring Hematuria.  Patient reports this has been ongoing for the last 6 months intermittently, but consistently the last 2 WEEKS., and now becoming very bothersome to her. RECENTLY  She describes her episodes with bright red blood, like menses she is reports, occasionally dark brown without any clots.    She reports accompanying factors including abdominal pain, which is intermittent, occasionally constant dull and achy.  She reports urinary symptoms of frequency, urgency, and nocturia sometimes every hour at nighttime.  She reports dysuria, burning on urination, pelvic pain and suprapubic discomfort.  She does have many urinary incontinent episodes, hence wears 2-3 pads daily, she has lower back pain-chronic in origin, reports flank pain, she does not have any CVA tenderness.  SHe denies any issues with recurrent UTIs, HER urine dipstick today is completely negative of any infection, it is negative for gross/microscopic hematuria.  Her PVR is 0.    The Patient reports she recently gotevaluated for a complete hysterectomy, but is very Adamant. She would like to get  fitted for a Pessary instead she states. She reports cervical irritation and discomfort per her OBGYH.     She is a , and denies any smoking history. She however reports a significant family history of Uterince CA-, sisted, slii living in her  90s, Ovarian CA-mom and breast cancer-sister.       Objective   Vital Signs:  Ht 160 cm (63\")   Wt 49 kg (108 lb)   BMI 19.13 kg/m²     BMI is within normal parameters. No other follow-up " for BMI required.      Physical Exam  Constitutional:       General: She is in acute distress.      Appearance: She is well-developed. She is ill-appearing.   HENT:      Head: Normocephalic and atraumatic.   Eyes:      Pupils: Pupils are equal, round, and reactive to light.   Neck:      Thyroid: No thyromegaly.      Trachea: No tracheal deviation.   Cardiovascular:      Rate and Rhythm: Normal rate and regular rhythm.      Heart sounds: No murmur heard.  Pulmonary:      Effort: Pulmonary effort is normal. No respiratory distress.      Breath sounds: Normal breath sounds. No stridor. No wheezing.   Abdominal:      General: Bowel sounds are normal.      Palpations: Abdomen is soft.      Tenderness: There is abdominal tenderness. There is guarding.   Genitourinary:     Labia:         Right: No tenderness.         Left: No tenderness.       Vagina: Normal. No vaginal discharge.      Comments: Soft nontender abdomen with no organomegaly, rigidity, guarding or tenderness.  Normal but atrophic vaginal orifice. She has moderate leakage with Valsalva.  No significant perineal body abnormalities and a normal external anus.     Musculoskeletal:         General: Tenderness present. No deformity. Normal range of motion.      Cervical back: Normal range of motion.   Skin:     General: Skin is warm and dry.      Capillary Refill: Capillary refill takes less than 2 seconds.      Coloration: Skin is not pale.      Findings: No erythema or rash.   Neurological:      Mental Status: She is alert and oriented to person, place, and time.      Cranial Nerves: No cranial nerve deficit.      Sensory: No sensory deficit.      Motor: Weakness present.      Coordination: Coordination normal.   Psychiatric:         Behavior: Behavior normal.         Thought Content: Thought content normal.         Judgment: Judgment normal.        Result Review :    UA    Urinalysis 6/6/22   Ketones, UA Negative   Leukocytes, UA Negative           Urine Culture     Urine Culture 6/6/22   Urine Culture No growth                         Assessment and Plan   Diagnoses and all orders for this visit:    1. Gross hematuria (Primary)  -     CT Abdomen Pelvis With & Without Contrast  -     Urine Culture - Urine, Urine, Clean Catch    2. Urinary tract infection without hematuria, site unspecified  -     Urine Culture - Urine, Urine, Clean Catch    3. Pelvic organ prolapse quantification stage 3 rectocele  -     CT Abdomen Pelvis With & Without Contrast    4. Frequency of urination  -     POC Urinalysis Dipstick, Automated    Other orders  -     Bladder Scan                         ASSESSMENT  GROSS/MICROSCOPIC HEMATURIA/ACUTE CYSTITIS/DETRUSOR INSTABILITY  MS ROGER WEI is a pleasant 76 year old female evaluated with concerns for gross and microscopic hematuria. She is in apparent discomfort and repots feeling unwell. Her urine dipstick to day is completely negative. Her PVR s 0cc.    Hematuria:  Discussed Microscopic VS Gross Hematuria with patient. SHe has been very symptomatic and has some gross hematuria 1 week ago. We discussed the possible causes such as infection in the bladder, kidney, or bladder discomfort, trauma, kidney stones, different kinds of cancers, vigorous exercise, viral illness, such as hepatitis a virus that causes liver disease and inflammation of the liver and sexual activity.    Discussed Microscopic Hematuria with patient. She has been asymptomatic.Pt educated on possible causes such as infection in the bladder, kidney, or bladder discomfort, trauma. vigorous exercise, different kinds of cancers, viral illness, such as hepatitis a virus that causes liver disease and inflammation of the liver and sexual activity.  WE Discussed the fact that there is about a 96% chance of a negative workup with episodes of microscopic hematuria and with much greater in the face of Gross hematuria.  We discussed the fact that this is a non-cumulative test.  In other  words if there is hematuria next year I would recommend continuing to work up the condition because of the fact that neoplasms may be small at the first workup and easily are missed.   We discussed the fact that if there is any history of chronic kidney disease or risk factors such as diabetes for contrast a noncontrasted study will be utilized.  We will initiate an investigation.  Her CT scan or 12/19/20 done at [facility] was  was negative.  pending Lower tract investigation      We discussed the risk factors for recurrent infections being intercourse in younger patients and atrophic changes in older patients.  We discussed the symptoms that are found including pain, pressure, burning, frequency, urgency suprapubic pain and painful intercourse.  I discussed upper tract symptoms including fevers, chills, and indicated the workup would be much more aggressive if the patient were to present with recurrent infections in the face of upper tract symptomatology such as fever.  I discussed the history of vesicoureteral reflux in young patients and finally chronic renal scarring as  A result.        PLAN  We will resend her urine for culture, I will call her with results if any positive bacterial growth.    We discussed restarting antibiotic suppressive therapy with Macrobid, if any positive bacteria     I recommend concomitant probiotics with treatment with antibiotics to protect the rectal reservoir including over-the-counter yogurt preparations to penelope oral pills containing the appropriate probiotics.     We discussed the use of both an upper and lower tract investigation.  I discussed the fact that an upper tract investigation includes a  CT scan with contrast being the gold standard to diagnose the small neoplasms-patient has been scheduled.     We discussed the lower tract investigation consisting of a cystoscopy and patient has been scheduled for cystoscopy with Dr. Garcia on 08/16/2019     Follow-up with patient  in 2 weeks recheck her urinalysis, and also review CT scan results.    Encouraged patient  To follow up with OBGYN for pelvic organ prolapse, and discuss options.    Patient is agreeable plan of care.    Follow Up   Return in about 2 weeks (around 6/20/2022) for Next scheduled follow up, DR MEADOWS, Cystoscopy FOR , GROSS/MICRO HEMATURIA.     Patient was given instructions and counseling regarding her condition or for health maintenance advice. Please see specific information pulled into the AVS if appropriate.

## 2022-06-07 LAB — BACTERIA SPEC AEROBE CULT: NO GROWTH

## 2022-06-14 ENCOUNTER — HOSPITAL ENCOUNTER (OUTPATIENT)
Dept: CT IMAGING | Facility: HOSPITAL | Age: 77
Discharge: HOME | End: 2022-06-14
Admitting: NURSE PRACTITIONER

## 2022-06-14 LAB — CREAT BLDA-MCNC: 0.9 MG/DL (ref 0.6–1.3)

## 2022-06-14 PROCEDURE — 0 IOPAMIDOL PER 1 ML: Performed by: NURSE PRACTITIONER

## 2022-06-14 PROCEDURE — 74178 CT ABD&PLV WO CNTR FLWD CNTR: CPT | Performed by: RADIOLOGY

## 2022-06-14 PROCEDURE — 82565 ASSAY OF CREATININE: CPT

## 2022-06-14 PROCEDURE — 74178 CT ABD&PLV WO CNTR FLWD CNTR: CPT

## 2022-06-14 RX ADMIN — IOPAMIDOL 50 ML: 755 INJECTION, SOLUTION INTRAVENOUS at 08:52

## 2022-06-15 ENCOUNTER — TELEPHONE (OUTPATIENT)
Dept: UROLOGY | Facility: CLINIC | Age: 77
End: 2022-06-15

## 2022-06-15 NOTE — TELEPHONE ENCOUNTER
I called the pt and left a vm that from a urological standpoint that everything looked good and for them to please come to the follow up to go over everything with the doctor where we can only read the report and not the imaging.

## 2022-06-20 ENCOUNTER — PROCEDURE VISIT (OUTPATIENT)
Dept: UROLOGY | Facility: CLINIC | Age: 77
End: 2022-06-20

## 2022-06-20 VITALS — WEIGHT: 108 LBS | HEIGHT: 63 IN | BODY MASS INDEX: 19.14 KG/M2

## 2022-06-20 DIAGNOSIS — R35.0 FREQUENCY OF URINATION: Primary | ICD-10-CM

## 2022-06-20 PROCEDURE — 52000 CYSTOURETHROSCOPY: CPT | Performed by: UROLOGY

## 2022-06-20 PROCEDURE — 87186 SC STD MICRODIL/AGAR DIL: CPT | Performed by: UROLOGY

## 2022-06-20 PROCEDURE — 87086 URINE CULTURE/COLONY COUNT: CPT | Performed by: UROLOGY

## 2022-06-20 PROCEDURE — 87088 URINE BACTERIA CULTURE: CPT | Performed by: UROLOGY

## 2022-06-20 NOTE — PROGRESS NOTES
Chief Complaint:       Gross hematuria         HPI:       Ms. Wills is a 77 yo female who is here today for cystoscopy due to a history of gross hematuria.  She reports that she has had 1 episode of gross hematuria approximately 2 to 3 weeks ago.  She is unsure whether it was blood in the urine versus vaginal bleeding.  She is being seen by gynecology for prolapse.    I reviewed her CT urogram which did not show any concerning findings urologically.  However, she did have a lytic lesion in the vertebrae.     Of note she has had 2 family members diagnosed with bladder cancer.    Procedure:      Procedure: Cystoscopy Female    Indication: Gross hematuria    Urinalysis Performed Today:  Negative for Infection    Informed Consent Obtained    Sterile prep performed in usual fashion    6 cc of topical lidocaine inserted urethrally    Flexible cystoscope inserted and bladder examined    Findings: normal: Urethra without lesions, Bladder mucosa without tumors or lesions, No stones seen, ureteral orifices are in orthotopic position and size.    Additional Procedure with Cystoscopy: none      Discussion:      Ms. Wills is a 76-year-old female with a history of gross hematuria.  She has had a negative hematuria work-up at this point.  Her cystoscopy did not reveal any abnormalities.  She is being followed by GYN for prolapse.  I advised her to discuss her lytic lesion in the vertebrae with her primary care.    Assessment:     Encounter Diagnosis   Name Primary?   • Frequency of urination Yes     Orders Placed This Encounter   Procedures   • Urine Culture - Urine, Urine, Clean Catch     Order Specific Question:   Release to patient     Answer:   Immediate     NOW@

## 2022-06-22 LAB — BACTERIA SPEC AEROBE CULT: ABNORMAL

## 2022-06-24 ENCOUNTER — TELEPHONE (OUTPATIENT)
Dept: UROLOGY | Facility: CLINIC | Age: 77
End: 2022-06-24

## 2022-06-24 DIAGNOSIS — N30.00 ACUTE CYSTITIS WITHOUT HEMATURIA: Primary | ICD-10-CM

## 2022-06-24 RX ORDER — CEPHALEXIN 500 MG/1
500 CAPSULE ORAL 2 TIMES DAILY
Qty: 6 CAPSULE | Refills: 0 | Status: SHIPPED | OUTPATIENT
Start: 2022-06-24 | End: 2022-06-27

## 2022-06-24 NOTE — TELEPHONE ENCOUNTER
Called patient to discuss culture results.  Advised that she has E. coli that is pansensitive.  We will send in prescription for Keflex.  She reports she will pick it up soon as possible.

## 2022-07-24 VITALS
OXYGEN SATURATION: 96 % | HEIGHT: 63 IN | TEMPERATURE: 97.1 F | RESPIRATION RATE: 20 BRPM | DIASTOLIC BLOOD PRESSURE: 84 MMHG | HEART RATE: 112 BPM | BODY MASS INDEX: 18.78 KG/M2 | WEIGHT: 106 LBS | SYSTOLIC BLOOD PRESSURE: 176 MMHG

## 2022-07-24 PROCEDURE — 99281 EMR DPT VST MAYX REQ PHY/QHP: CPT

## 2022-07-25 ENCOUNTER — HOSPITAL ENCOUNTER (EMERGENCY)
Facility: HOSPITAL | Age: 77
Discharge: LEFT BEFORE DISCHARGE | End: 2022-07-25
Admitting: STUDENT IN AN ORGANIZED HEALTH CARE EDUCATION/TRAINING PROGRAM

## 2022-07-25 NOTE — ED NOTES
MEDICAL SCREENING:    Reason for Visit: Dysuria     Patient initially seen in triage.  The patient was advised further evaluation and diagnostic testing will be needed, some of the treatment and testing will be initiated in the lobby in order to begin the process.  The patient will be returned to the waiting area for the time being and possibly be re-assessed by a subsequent ED provider.  The patient will be brought back to the treatment area in as timely manner as possible.       Bárbara Portillo, APRN  07/24/22 1512

## 2022-07-25 NOTE — ED NOTES
Called pt to draw ordered labs at this time, pt was unable to be located in ED waiting area or outside of ED.

## 2022-08-01 ENCOUNTER — HOSPITAL ENCOUNTER (EMERGENCY)
Facility: HOSPITAL | Age: 77
Discharge: HOME | End: 2022-08-01
Attending: EMERGENCY MEDICINE | Admitting: EMERGENCY MEDICINE

## 2022-08-01 ENCOUNTER — APPOINTMENT (OUTPATIENT)
Dept: GENERAL RADIOLOGY | Facility: HOSPITAL | Age: 77
End: 2022-08-01

## 2022-08-01 VITALS
TEMPERATURE: 98.5 F | HEART RATE: 92 BPM | BODY MASS INDEX: 18.1 KG/M2 | RESPIRATION RATE: 18 BRPM | DIASTOLIC BLOOD PRESSURE: 68 MMHG | SYSTOLIC BLOOD PRESSURE: 132 MMHG | HEIGHT: 64 IN | OXYGEN SATURATION: 94 % | WEIGHT: 106 LBS

## 2022-08-01 DIAGNOSIS — E86.0 DEHYDRATION: Primary | ICD-10-CM

## 2022-08-01 LAB
ALBUMIN SERPL-MCNC: 4.12 G/DL (ref 3.5–5.2)
ALBUMIN/GLOB SERPL: 1.4 G/DL
ALP SERPL-CCNC: 87 U/L (ref 39–117)
ALT SERPL W P-5'-P-CCNC: 19 U/L (ref 1–33)
ANION GAP SERPL CALCULATED.3IONS-SCNC: 14.2 MMOL/L (ref 5–15)
APTT PPP: 25 SECONDS (ref 26.5–34.5)
AST SERPL-CCNC: 25 U/L (ref 1–32)
BASOPHILS # BLD AUTO: 0.01 10*3/MM3 (ref 0–0.2)
BASOPHILS NFR BLD AUTO: 0.1 % (ref 0–1.5)
BILIRUB SERPL-MCNC: 0.4 MG/DL (ref 0–1.2)
BUN SERPL-MCNC: 21 MG/DL (ref 8–23)
BUN/CREAT SERPL: 21.9 (ref 7–25)
CALCIUM SPEC-SCNC: 9.1 MG/DL (ref 8.6–10.5)
CHLORIDE SERPL-SCNC: 100 MMOL/L (ref 98–107)
CO2 SERPL-SCNC: 18.8 MMOL/L (ref 22–29)
CREAT SERPL-MCNC: 0.96 MG/DL (ref 0.57–1)
D-LACTATE SERPL-SCNC: 1.1 MMOL/L (ref 0.5–2)
DEPRECATED RDW RBC AUTO: 44.4 FL (ref 37–54)
EGFRCR SERPLBLD CKD-EPI 2021: 61.4 ML/MIN/1.73
EOSINOPHIL # BLD AUTO: 0 10*3/MM3 (ref 0–0.4)
EOSINOPHIL NFR BLD AUTO: 0 % (ref 0.3–6.2)
ERYTHROCYTE [DISTWIDTH] IN BLOOD BY AUTOMATED COUNT: 13.2 % (ref 12.3–15.4)
FLUAV RNA RESP QL NAA+PROBE: NOT DETECTED
FLUBV RNA RESP QL NAA+PROBE: NOT DETECTED
GLOBULIN UR ELPH-MCNC: 3 GM/DL
GLUCOSE SERPL-MCNC: 108 MG/DL (ref 65–99)
HCT VFR BLD AUTO: 39.8 % (ref 34–46.6)
HGB BLD-MCNC: 13.4 G/DL (ref 12–15.9)
HOLD SPECIMEN: NORMAL
HOLD SPECIMEN: NORMAL
IMM GRANULOCYTES # BLD AUTO: 0.02 10*3/MM3 (ref 0–0.05)
IMM GRANULOCYTES NFR BLD AUTO: 0.3 % (ref 0–0.5)
INR PPP: 0.95 (ref 0.9–1.1)
LYMPHOCYTES # BLD AUTO: 1.51 10*3/MM3 (ref 0.7–3.1)
LYMPHOCYTES NFR BLD AUTO: 21.8 % (ref 19.6–45.3)
MCH RBC QN AUTO: 30.7 PG (ref 26.6–33)
MCHC RBC AUTO-ENTMCNC: 33.7 G/DL (ref 31.5–35.7)
MCV RBC AUTO: 91.3 FL (ref 79–97)
MONOCYTES # BLD AUTO: 0.43 10*3/MM3 (ref 0.1–0.9)
MONOCYTES NFR BLD AUTO: 6.2 % (ref 5–12)
NEUTROPHILS NFR BLD AUTO: 4.96 10*3/MM3 (ref 1.7–7)
NEUTROPHILS NFR BLD AUTO: 71.6 % (ref 42.7–76)
NRBC BLD AUTO-RTO: 0 /100 WBC (ref 0–0.2)
PLATELET # BLD AUTO: 202 10*3/MM3 (ref 140–450)
PMV BLD AUTO: 9.3 FL (ref 6–12)
POTASSIUM SERPL-SCNC: 3.9 MMOL/L (ref 3.5–5.2)
PROCALCITONIN SERPL-MCNC: 0.08 NG/ML (ref 0–0.25)
PROT SERPL-MCNC: 7.1 G/DL (ref 6–8.5)
PROTHROMBIN TIME: 12.9 SECONDS (ref 12.1–14.7)
QT INTERVAL: 322 MS
QTC INTERVAL: 433 MS
RBC # BLD AUTO: 4.36 10*6/MM3 (ref 3.77–5.28)
SARS-COV-2 RNA RESP QL NAA+PROBE: DETECTED
SODIUM SERPL-SCNC: 133 MMOL/L (ref 136–145)
TROPONIN T SERPL-MCNC: <0.01 NG/ML (ref 0–0.03)
WBC NRBC COR # BLD: 6.93 10*3/MM3 (ref 3.4–10.8)
WHOLE BLOOD HOLD COAG: NORMAL
WHOLE BLOOD HOLD SPECIMEN: NORMAL

## 2022-08-01 PROCEDURE — 36415 COLL VENOUS BLD VENIPUNCTURE: CPT

## 2022-08-01 PROCEDURE — 83605 ASSAY OF LACTIC ACID: CPT | Performed by: PHYSICIAN ASSISTANT

## 2022-08-01 PROCEDURE — C9803 HOPD COVID-19 SPEC COLLECT: HCPCS | Performed by: PHYSICIAN ASSISTANT

## 2022-08-01 PROCEDURE — 85610 PROTHROMBIN TIME: CPT | Performed by: PHYSICIAN ASSISTANT

## 2022-08-01 PROCEDURE — 93005 ELECTROCARDIOGRAM TRACING: CPT | Performed by: PHYSICIAN ASSISTANT

## 2022-08-01 PROCEDURE — 99284 EMERGENCY DEPT VISIT MOD MDM: CPT

## 2022-08-01 PROCEDURE — 84484 ASSAY OF TROPONIN QUANT: CPT | Performed by: PHYSICIAN ASSISTANT

## 2022-08-01 PROCEDURE — 87636 SARSCOV2 & INF A&B AMP PRB: CPT | Performed by: PHYSICIAN ASSISTANT

## 2022-08-01 PROCEDURE — 87040 BLOOD CULTURE FOR BACTERIA: CPT | Performed by: PHYSICIAN ASSISTANT

## 2022-08-01 PROCEDURE — 80053 COMPREHEN METABOLIC PANEL: CPT | Performed by: PHYSICIAN ASSISTANT

## 2022-08-01 PROCEDURE — 85025 COMPLETE CBC W/AUTO DIFF WBC: CPT | Performed by: PHYSICIAN ASSISTANT

## 2022-08-01 PROCEDURE — 71045 X-RAY EXAM CHEST 1 VIEW: CPT

## 2022-08-01 PROCEDURE — 85730 THROMBOPLASTIN TIME PARTIAL: CPT | Performed by: PHYSICIAN ASSISTANT

## 2022-08-01 PROCEDURE — 84145 PROCALCITONIN (PCT): CPT | Performed by: PHYSICIAN ASSISTANT

## 2022-08-01 RX ORDER — SODIUM CHLORIDE 0.9 % (FLUSH) 0.9 %
10 SYRINGE (ML) INJECTION AS NEEDED
Status: DISCONTINUED | OUTPATIENT
Start: 2022-08-01 | End: 2022-08-01 | Stop reason: HOSPADM

## 2022-08-01 RX ADMIN — SODIUM CHLORIDE 1000 ML: 9 INJECTION, SOLUTION INTRAVENOUS at 18:47

## 2022-08-01 NOTE — ED PROVIDER NOTES
Subjective   Sent to ER by family doctor for dehydration      Dehydration  Severity:  Mild  Onset quality:  Gradual  Duration:  1 day  Timing:  Constant  Chronicity:  New  Associated symptoms: fatigue        Review of Systems   Constitutional: Positive for activity change and fatigue.   HENT: Negative.    Eyes: Negative.    Respiratory: Negative.    Cardiovascular: Negative.    Gastrointestinal: Negative.    Endocrine: Negative.    Genitourinary: Positive for frequency.   Musculoskeletal: Negative.    Skin: Negative.    Allergic/Immunologic: Negative.    Neurological: Negative.    Hematological: Negative.    Psychiatric/Behavioral: Negative.        Past Medical History:   Diagnosis Date   • Depression    • Hypertension    • Osteoporosis        Allergies   Allergen Reactions   • Codeine GI Intolerance   • Bactrim [Sulfamethoxazole-Trimethoprim]        Past Surgical History:   Procedure Laterality Date   • BREAST BIOPSY Right    • VAGINAL DELIVERY         Family History   Problem Relation Age of Onset   • Heart failure Father    • Heart attack Father    • Breast cancer Sister 45   • Ovarian cancer Mother        Social History     Socioeconomic History   • Marital status:    Tobacco Use   • Smoking status: Never Smoker   • Smokeless tobacco: Never Used   Vaping Use   • Vaping Use: Never used   Substance and Sexual Activity   • Alcohol use: No   • Drug use: No   • Sexual activity: Defer           Objective   Physical Exam  Vitals and nursing note reviewed.   Constitutional:       Appearance: Normal appearance.   HENT:      Head: Normocephalic.      Nose: Nose normal.   Eyes:      Pupils: Pupils are equal, round, and reactive to light.   Cardiovascular:      Rate and Rhythm: Normal rate.   Pulmonary:      Effort: Pulmonary effort is normal.   Abdominal:      General: Abdomen is flat.   Genitourinary:     General: Normal vulva.   Musculoskeletal:         General: Normal range of motion.      Cervical back: Normal  range of motion.   Skin:     General: Skin is warm.      Capillary Refill: Capillary refill takes less than 2 seconds.   Neurological:      General: No focal deficit present.      Mental Status: She is alert.         Procedures           ED Course  ED Course as of 08/01/22 1954   Mon Aug 01, 2022   1658 eCG 16:28 Sinus tachycardia, rate 109. Borderline ECG. QT/qTc 322/433 [ISABELLE]   1946 CXR : COPD [ISABELLE]      ED Course User Index  [ISABELLE] Santosh Rizzo MD                                           [facility]    Final diagnoses:   Dehydration       ED Disposition  ED Disposition     ED Disposition   Discharge    Condition   Stable    Comment   --             TonyZarina, APRN  [id number]  [facility] [address]  [phone]    Schedule an appointment as soon as possible for a visit   If symptoms worsen         Medication List      No changes were made to your prescriptions during this visit.          Santosh Rizzo MD  08/01/22 1954

## 2022-08-01 NOTE — ED NOTES
MEDICAL SCREENING:    Reason for Visit: Patient sent by primary care provider due to concerns of dehydration and tachycardia.  Patient is recently out of quarantine for COVID.  She denies nausea vomiting.  No chest pain pressure tightness or squeezing.  Patient is notably tachycardic.  EKG performed.    Patient initially seen in triage.  The patient was advised further evaluation and diagnostic testing will be needed, some of the treatment and testing will be initiated in the lobby in order to begin the process.  The patient will be returned to the waiting area for the time being and possibly be re-assessed by a subsequent ED provider.  The patient will be brought back to the treatment area in as timely manner as possible.         Brendon Cantrell PA  08/01/22 [id number]

## 2022-08-06 LAB
BACTERIA SPEC AEROBE CULT: NORMAL
BACTERIA SPEC AEROBE CULT: NORMAL

## 2022-09-28 ENCOUNTER — TRANSCRIBE ORDERS (OUTPATIENT)
Dept: ADMINISTRATIVE | Facility: HOSPITAL | Age: 77
End: 2022-09-28

## 2022-09-28 DIAGNOSIS — R13.19 OTHER DYSPHAGIA: Primary | ICD-10-CM

## 2022-10-04 ENCOUNTER — HOSPITAL ENCOUNTER (OUTPATIENT)
Dept: ULTRASOUND IMAGING | Facility: HOSPITAL | Age: 77
Discharge: HOME | End: 2022-10-04
Admitting: NURSE PRACTITIONER

## 2022-10-04 DIAGNOSIS — R13.19 OTHER DYSPHAGIA: ICD-10-CM

## 2022-10-04 PROCEDURE — 76536 US EXAM OF HEAD AND NECK: CPT | Performed by: RADIOLOGY

## 2022-10-04 PROCEDURE — 76536 US EXAM OF HEAD AND NECK: CPT
